# Patient Record
Sex: FEMALE | Race: WHITE | Employment: PART TIME | URBAN - METROPOLITAN AREA
[De-identification: names, ages, dates, MRNs, and addresses within clinical notes are randomized per-mention and may not be internally consistent; named-entity substitution may affect disease eponyms.]

---

## 2017-01-01 ENCOUNTER — ANTI-COAG VISIT (OUTPATIENT)
Dept: FAMILY MEDICINE CLINIC | Age: 82
End: 2017-01-01

## 2017-01-01 ENCOUNTER — ANTI-COAG VISIT (OUTPATIENT)
Dept: PHARMACY | Age: 82
End: 2017-01-01

## 2017-01-01 ENCOUNTER — TELEPHONE (OUTPATIENT)
Dept: CARDIOLOGY CLINIC | Age: 82
End: 2017-01-01

## 2017-01-01 ENCOUNTER — TELEPHONE (OUTPATIENT)
Dept: PHARMACY | Age: 82
End: 2017-01-01

## 2017-01-01 ENCOUNTER — HOSPITAL ENCOUNTER (OUTPATIENT)
Dept: OTHER | Age: 82
Discharge: HOME | End: 2017-11-27
Attending: FAMILY MEDICINE | Admitting: FAMILY MEDICINE

## 2017-01-01 ENCOUNTER — OFFICE VISIT (OUTPATIENT)
Dept: FAMILY MEDICINE CLINIC | Age: 82
End: 2017-01-01

## 2017-01-01 ENCOUNTER — OFFICE VISIT (OUTPATIENT)
Dept: CARDIOLOGY CLINIC | Age: 82
End: 2017-01-01

## 2017-01-01 ENCOUNTER — HOSPITAL ENCOUNTER (OUTPATIENT)
Dept: CT IMAGING | Age: 82
Discharge: HOME | End: 2017-02-03
Attending: INTERNAL MEDICINE | Admitting: INTERNAL MEDICINE

## 2017-01-01 ENCOUNTER — HOSPITAL ENCOUNTER (OUTPATIENT)
Dept: OTHER | Age: 82
Discharge: HOME | End: 2017-12-31
Attending: INTERNAL MEDICINE | Admitting: INTERNAL MEDICINE

## 2017-01-01 ENCOUNTER — HOSPITAL ENCOUNTER (OUTPATIENT)
Dept: OTHER | Age: 82
Discharge: HOME | End: 2017-10-31
Attending: FAMILY MEDICINE | Admitting: FAMILY MEDICINE

## 2017-01-01 ENCOUNTER — HOSPITAL ENCOUNTER (OUTPATIENT)
Dept: MAMMOGRAPHY | Age: 82
Discharge: HOME | End: 2017-11-20
Attending: FAMILY MEDICINE | Admitting: FAMILY MEDICINE

## 2017-01-01 ENCOUNTER — OFFICE VISIT (OUTPATIENT)
Dept: INFECTIOUS DISEASES | Age: 82
End: 2017-01-01

## 2017-01-01 ENCOUNTER — TELEPHONE (OUTPATIENT)
Dept: FAMILY MEDICINE CLINIC | Age: 82
End: 2017-01-01

## 2017-01-01 ENCOUNTER — HOSPITAL ENCOUNTER (OUTPATIENT)
Dept: OTHER | Age: 82
Discharge: HOME | End: 2017-01-25
Attending: INTERNAL MEDICINE | Admitting: INTERNAL MEDICINE

## 2017-01-01 ENCOUNTER — HOSPITAL ENCOUNTER (OUTPATIENT)
Dept: OTHER | Age: 82
Discharge: HOME | End: 2017-10-02
Attending: FAMILY MEDICINE | Admitting: FAMILY MEDICINE

## 2017-01-01 ENCOUNTER — HOSPITAL ENCOUNTER (OUTPATIENT)
Dept: OTHER | Age: 82
Discharge: HOME | End: 2017-01-26
Attending: FAMILY MEDICINE | Admitting: FAMILY MEDICINE

## 2017-01-01 ENCOUNTER — HOSPITAL ENCOUNTER (OUTPATIENT)
Dept: VASCULAR LAB | Age: 82
Discharge: HOME | End: 2017-06-23
Admitting: SURGERY

## 2017-01-01 ENCOUNTER — HOSPITAL ENCOUNTER (OUTPATIENT)
Dept: NON INVASIVE DIAGNOSTICS | Age: 82
Discharge: HOME | End: 2017-10-02
Attending: INTERNAL MEDICINE | Admitting: INTERNAL MEDICINE

## 2017-01-01 ENCOUNTER — TELEPHONE (OUTPATIENT)
Dept: VASCULAR SURGERY | Age: 82
End: 2017-01-01

## 2017-01-01 ENCOUNTER — HOSPITAL ENCOUNTER (OUTPATIENT)
Dept: OTHER | Age: 82
Discharge: HOME | End: 2017-08-22
Attending: FAMILY MEDICINE | Admitting: FAMILY MEDICINE

## 2017-01-01 ENCOUNTER — HOSPITAL ENCOUNTER (OUTPATIENT)
Dept: ENDOSCOPY | Age: 82
Discharge: HOME | End: 2017-12-06
Attending: INTERNAL MEDICINE | Admitting: INTERNAL MEDICINE

## 2017-01-01 ENCOUNTER — OFFICE VISIT (OUTPATIENT)
Dept: VASCULAR SURGERY | Age: 82
End: 2017-01-01

## 2017-01-01 ENCOUNTER — OFFICE VISIT (OUTPATIENT)
Dept: ORTHOPEDIC SURGERY | Age: 82
End: 2017-01-01

## 2017-01-01 ENCOUNTER — HOSPITAL ENCOUNTER (OUTPATIENT)
Dept: OTHER | Age: 82
Discharge: HOME | End: 2017-01-11
Attending: INTERNAL MEDICINE | Admitting: INTERNAL MEDICINE

## 2017-01-01 ENCOUNTER — HOSPITAL ENCOUNTER (OUTPATIENT)
Dept: GENERAL RADIOLOGY | Age: 82
Discharge: HOME | End: 2017-09-19
Attending: FAMILY MEDICINE | Admitting: FAMILY MEDICINE

## 2017-01-01 ENCOUNTER — HOSPITAL ENCOUNTER (OUTPATIENT)
Dept: OTHER | Age: 82
Discharge: HOME | End: 2017-06-30
Attending: INTERNAL MEDICINE | Admitting: INTERNAL MEDICINE

## 2017-01-01 ENCOUNTER — HOSPITAL ENCOUNTER (OUTPATIENT)
Dept: VASCULAR LAB | Age: 82
Discharge: HOME | End: 2017-12-29
Attending: SURGERY | Admitting: SURGERY

## 2017-01-01 ENCOUNTER — HOSPITAL ENCOUNTER (OUTPATIENT)
Dept: OTHER | Age: 82
Discharge: HOME | End: 2017-03-06
Attending: FAMILY MEDICINE | Admitting: FAMILY MEDICINE

## 2017-01-01 ENCOUNTER — HOSPITAL ENCOUNTER (OUTPATIENT)
Dept: OTHER | Age: 82
Discharge: HOME | End: 2017-11-30
Attending: INTERNAL MEDICINE | Admitting: INTERNAL MEDICINE

## 2017-01-01 ENCOUNTER — HOSPITAL ENCOUNTER (OUTPATIENT)
Dept: OTHER | Age: 82
Discharge: HOME | End: 2017-11-30
Attending: FAMILY MEDICINE | Admitting: FAMILY MEDICINE

## 2017-01-01 ENCOUNTER — HOSPITAL ENCOUNTER (OUTPATIENT)
Dept: OTHER | Age: 82
Discharge: HOME | End: 2017-07-18
Attending: FAMILY MEDICINE | Admitting: FAMILY MEDICINE

## 2017-01-01 VITALS
BODY MASS INDEX: 22.71 KG/M2 | SYSTOLIC BLOOD PRESSURE: 140 MMHG | HEART RATE: 68 BPM | HEIGHT: 64 IN | WEIGHT: 133 LBS | DIASTOLIC BLOOD PRESSURE: 60 MMHG

## 2017-01-01 VITALS
SYSTOLIC BLOOD PRESSURE: 120 MMHG | DIASTOLIC BLOOD PRESSURE: 62 MMHG | HEIGHT: 64 IN | OXYGEN SATURATION: 98 % | WEIGHT: 140 LBS | BODY MASS INDEX: 23.9 KG/M2 | HEART RATE: 85 BPM

## 2017-01-01 VITALS
DIASTOLIC BLOOD PRESSURE: 70 MMHG | WEIGHT: 138 LBS | SYSTOLIC BLOOD PRESSURE: 130 MMHG | BODY MASS INDEX: 23.69 KG/M2 | HEART RATE: 104 BPM | OXYGEN SATURATION: 98 %

## 2017-01-01 VITALS
BODY MASS INDEX: 23.34 KG/M2 | WEIGHT: 136 LBS | HEART RATE: 88 BPM | OXYGEN SATURATION: 98 % | DIASTOLIC BLOOD PRESSURE: 78 MMHG | SYSTOLIC BLOOD PRESSURE: 138 MMHG

## 2017-01-01 VITALS
SYSTOLIC BLOOD PRESSURE: 142 MMHG | BODY MASS INDEX: 25.58 KG/M2 | DIASTOLIC BLOOD PRESSURE: 70 MMHG | OXYGEN SATURATION: 93 % | HEART RATE: 85 BPM | WEIGHT: 149 LBS

## 2017-01-01 VITALS
HEIGHT: 64 IN | BODY MASS INDEX: 26.46 KG/M2 | DIASTOLIC BLOOD PRESSURE: 66 MMHG | SYSTOLIC BLOOD PRESSURE: 144 MMHG | WEIGHT: 155 LBS

## 2017-01-01 VITALS
WEIGHT: 132 LBS | OXYGEN SATURATION: 99 % | SYSTOLIC BLOOD PRESSURE: 116 MMHG | HEIGHT: 64 IN | BODY MASS INDEX: 22.53 KG/M2 | DIASTOLIC BLOOD PRESSURE: 70 MMHG | HEART RATE: 92 BPM

## 2017-01-01 VITALS
WEIGHT: 133.9 LBS | HEART RATE: 78 BPM | HEIGHT: 64 IN | SYSTOLIC BLOOD PRESSURE: 138 MMHG | BODY MASS INDEX: 22.86 KG/M2 | DIASTOLIC BLOOD PRESSURE: 70 MMHG

## 2017-01-01 VITALS
HEIGHT: 64 IN | RESPIRATION RATE: 16 BRPM | TEMPERATURE: 97.4 F | SYSTOLIC BLOOD PRESSURE: 131 MMHG | HEART RATE: 70 BPM | BODY MASS INDEX: 22.53 KG/M2 | DIASTOLIC BLOOD PRESSURE: 80 MMHG | WEIGHT: 132 LBS

## 2017-01-01 VITALS
HEART RATE: 87 BPM | DIASTOLIC BLOOD PRESSURE: 50 MMHG | OXYGEN SATURATION: 96 % | WEIGHT: 136 LBS | SYSTOLIC BLOOD PRESSURE: 140 MMHG | BODY MASS INDEX: 22.63 KG/M2

## 2017-01-01 VITALS
WEIGHT: 133 LBS | TEMPERATURE: 98 F | HEIGHT: 64 IN | SYSTOLIC BLOOD PRESSURE: 137 MMHG | DIASTOLIC BLOOD PRESSURE: 81 MMHG | HEART RATE: 84 BPM | BODY MASS INDEX: 22.71 KG/M2

## 2017-01-01 VITALS — DIASTOLIC BLOOD PRESSURE: 64 MMHG | BODY MASS INDEX: 22.83 KG/M2 | WEIGHT: 133 LBS | SYSTOLIC BLOOD PRESSURE: 136 MMHG

## 2017-01-01 VITALS
BODY MASS INDEX: 25.06 KG/M2 | OXYGEN SATURATION: 97 % | DIASTOLIC BLOOD PRESSURE: 70 MMHG | WEIGHT: 146 LBS | HEART RATE: 99 BPM | SYSTOLIC BLOOD PRESSURE: 140 MMHG

## 2017-01-01 VITALS
SYSTOLIC BLOOD PRESSURE: 136 MMHG | OXYGEN SATURATION: 99 % | WEIGHT: 136 LBS | DIASTOLIC BLOOD PRESSURE: 62 MMHG | HEART RATE: 76 BPM | BODY MASS INDEX: 23.34 KG/M2

## 2017-01-01 VITALS
HEART RATE: 76 BPM | WEIGHT: 147 LBS | BODY MASS INDEX: 25.1 KG/M2 | SYSTOLIC BLOOD PRESSURE: 134 MMHG | DIASTOLIC BLOOD PRESSURE: 70 MMHG | HEIGHT: 64 IN

## 2017-01-01 DIAGNOSIS — E78.5 HYPERLIPIDEMIA, UNSPECIFIED HYPERLIPIDEMIA TYPE: Chronic | ICD-10-CM

## 2017-01-01 DIAGNOSIS — I10 ESSENTIAL HYPERTENSION: ICD-10-CM

## 2017-01-01 DIAGNOSIS — F51.01 PRIMARY INSOMNIA: ICD-10-CM

## 2017-01-01 DIAGNOSIS — R53.83 FATIGUE, UNSPECIFIED TYPE: ICD-10-CM

## 2017-01-01 DIAGNOSIS — I48.91 ATRIAL FIBRILLATION, UNSPECIFIED TYPE (HCC): ICD-10-CM

## 2017-01-01 DIAGNOSIS — I10 ESSENTIAL HYPERTENSION: Chronic | ICD-10-CM

## 2017-01-01 DIAGNOSIS — K21.9 GASTROESOPHAGEAL REFLUX DISEASE WITHOUT ESOPHAGITIS: ICD-10-CM

## 2017-01-01 DIAGNOSIS — I73.9 PAD (PERIPHERAL ARTERY DISEASE) (HCC): ICD-10-CM

## 2017-01-01 DIAGNOSIS — M35.3 PMR (POLYMYALGIA RHEUMATICA) (HCC): Primary | ICD-10-CM

## 2017-01-01 DIAGNOSIS — D50.9 IRON DEFICIENCY ANEMIA, UNSPECIFIED IRON DEFICIENCY ANEMIA TYPE: ICD-10-CM

## 2017-01-01 DIAGNOSIS — I48.20 CHRONIC ATRIAL FIBRILLATION (HCC): ICD-10-CM

## 2017-01-01 DIAGNOSIS — K83.1 AMPULLARY STENOSIS: ICD-10-CM

## 2017-01-01 DIAGNOSIS — H61.22 IMPACTED CERUMEN OF LEFT EAR: ICD-10-CM

## 2017-01-01 DIAGNOSIS — I65.23 BILATERAL CAROTID ARTERY STENOSIS: Chronic | ICD-10-CM

## 2017-01-01 DIAGNOSIS — I25.10 ASHD (ARTERIOSCLEROTIC HEART DISEASE): Primary | Chronic | ICD-10-CM

## 2017-01-01 DIAGNOSIS — I48.20 CHRONIC ATRIAL FIBRILLATION (HCC): Chronic | ICD-10-CM

## 2017-01-01 DIAGNOSIS — E08.51 DIABETES MELLITUS DUE TO UNDERLYING CONDITION WITH DIABETIC PERIPHERAL ANGIOPATHY WITHOUT GANGRENE, WITHOUT LONG-TERM CURRENT USE OF INSULIN (HCC): Chronic | ICD-10-CM

## 2017-01-01 DIAGNOSIS — I48.91 ATRIAL FIBRILLATION, UNSPECIFIED TYPE (HCC): Chronic | ICD-10-CM

## 2017-01-01 DIAGNOSIS — M00.9 PYOGENIC ARTHRITIS OF RIGHT KNEE JOINT, DUE TO UNSPECIFIED ORGANISM (HCC): Primary | ICD-10-CM

## 2017-01-01 DIAGNOSIS — I10 ESSENTIAL HYPERTENSION: Primary | Chronic | ICD-10-CM

## 2017-01-01 DIAGNOSIS — I34.0 NON-RHEUMATIC MITRAL REGURGITATION: Primary | ICD-10-CM

## 2017-01-01 DIAGNOSIS — D64.9 ANEMIA, UNSPECIFIED TYPE: ICD-10-CM

## 2017-01-01 DIAGNOSIS — I48.21 PERMANENT ATRIAL FIBRILLATION (HCC): ICD-10-CM

## 2017-01-01 DIAGNOSIS — E11.51 TYPE 2 DIABETES MELLITUS WITH DIABETIC PERIPHERAL ANGIOPATHY WITHOUT GANGRENE, WITHOUT LONG-TERM CURRENT USE OF INSULIN (HCC): ICD-10-CM

## 2017-01-01 DIAGNOSIS — I25.10 ASHD (ARTERIOSCLEROTIC HEART DISEASE): Chronic | ICD-10-CM

## 2017-01-01 DIAGNOSIS — R53.1 GENERALIZED WEAKNESS: ICD-10-CM

## 2017-01-01 DIAGNOSIS — Z78.0 POST-MENOPAUSAL: ICD-10-CM

## 2017-01-01 DIAGNOSIS — I65.23 BILATERAL CAROTID ARTERY STENOSIS: ICD-10-CM

## 2017-01-01 DIAGNOSIS — E08.51 DIABETES MELLITUS DUE TO UNDERLYING CONDITION WITH DIABETIC PERIPHERAL ANGIOPATHY WITHOUT GANGRENE, WITHOUT LONG-TERM CURRENT USE OF INSULIN (HCC): ICD-10-CM

## 2017-01-01 DIAGNOSIS — I65.23 CAROTID ATHEROSCLEROSIS, BILATERAL: Primary | ICD-10-CM

## 2017-01-01 DIAGNOSIS — I25.10 ATHEROSCLEROTIC HEART DISEASE OF NATIVE CORONARY ARTERY WITHOUT ANGINA PECTORIS: ICD-10-CM

## 2017-01-01 DIAGNOSIS — M35.3 PMR (POLYMYALGIA RHEUMATICA) (HCC): ICD-10-CM

## 2017-01-01 DIAGNOSIS — M85.80 OSTEOPENIA, UNSPECIFIED LOCATION: ICD-10-CM

## 2017-01-01 DIAGNOSIS — R10.11 ABDOMINAL PAIN, RUQ: ICD-10-CM

## 2017-01-01 DIAGNOSIS — I73.9 PERIPHERAL VASCULAR DISEASE, UNSPECIFIED (HCC): Primary | ICD-10-CM

## 2017-01-01 DIAGNOSIS — I65.23 CAROTID ATHEROSCLEROSIS, BILATERAL: ICD-10-CM

## 2017-01-01 DIAGNOSIS — K83.1 OBSTRUCTION OF BILE DUCT: ICD-10-CM

## 2017-01-01 DIAGNOSIS — I73.9 PAD (PERIPHERAL ARTERY DISEASE) (HCC): Chronic | ICD-10-CM

## 2017-01-01 DIAGNOSIS — F41.9 ANXIETY: Primary | ICD-10-CM

## 2017-01-01 DIAGNOSIS — Z78.0 ASYMPTOMATIC MENOPAUSAL STATE: ICD-10-CM

## 2017-01-01 DIAGNOSIS — D50.0 IRON DEFICIENCY ANEMIA DUE TO CHRONIC BLOOD LOSS: ICD-10-CM

## 2017-01-01 DIAGNOSIS — E08.51 DIABETES MELLITUS DUE TO UNDERLYING CONDITION WITH DIABETIC PERIPHERAL ANGIOPATHY WITHOUT GANGRENE, WITHOUT LONG-TERM CURRENT USE OF INSULIN (HCC): Primary | ICD-10-CM

## 2017-01-01 DIAGNOSIS — E78.49 OTHER HYPERLIPIDEMIA: Chronic | ICD-10-CM

## 2017-01-01 DIAGNOSIS — M85.80 OSTEOPENIA, UNSPECIFIED LOCATION: Primary | ICD-10-CM

## 2017-01-01 DIAGNOSIS — I73.9 PERIPHERAL VASCULAR DISEASE (HCC): ICD-10-CM

## 2017-01-01 DIAGNOSIS — I48.21 PERMANENT ATRIAL FIBRILLATION (HCC): Chronic | ICD-10-CM

## 2017-01-01 DIAGNOSIS — I73.9 PERIPHERAL VASCULAR DISEASE, UNSPECIFIED (HCC): ICD-10-CM

## 2017-01-01 DIAGNOSIS — I70.212 ATHEROSCLEROSIS OF NATIVE ARTERIES OF EXTREMITIES WITH INTERMITTENT CLAUDICATION, LEFT LEG (HCC): ICD-10-CM

## 2017-01-01 DIAGNOSIS — I25.10 ASHD (ARTERIOSCLEROTIC HEART DISEASE): ICD-10-CM

## 2017-01-01 DIAGNOSIS — Z78.0 POST-MENOPAUSAL: Primary | ICD-10-CM

## 2017-01-01 DIAGNOSIS — M85.852 OSTEOPENIA OF LEFT THIGH: ICD-10-CM

## 2017-01-01 DIAGNOSIS — I34.1 MITRAL VALVE PROLAPSE: ICD-10-CM

## 2017-01-01 DIAGNOSIS — Z12.31 ENCOUNTER FOR SCREENING MAMMOGRAM FOR BREAST CANCER: ICD-10-CM

## 2017-01-01 DIAGNOSIS — I34.0 NON-RHEUMATIC MITRAL REGURGITATION: ICD-10-CM

## 2017-01-01 DIAGNOSIS — I65.23 OCCLUSION AND STENOSIS OF BILATERAL CAROTID ARTERIES: ICD-10-CM

## 2017-01-01 LAB
A/G RATIO: 1.4 (ref 1.1–2.2)
A/G RATIO: 1.5 (ref 1.1–2.2)
A/G RATIO: 1.6 (CALC) (ref 1–2.5)
ALBUMIN SERPL-MCNC: 4 G/DL (ref 3.6–5.1)
ALBUMIN SERPL-MCNC: 4.3 G/DL (ref 3.4–5)
ALBUMIN SERPL-MCNC: 4.5 G/DL (ref 3.4–5)
ALP BLD-CCNC: 75 U/L (ref 33–130)
ALP BLD-CCNC: 77 U/L (ref 40–129)
ALP BLD-CCNC: 97 U/L (ref 40–129)
ALT SERPL-CCNC: 11 U/L (ref 6–29)
ALT SERPL-CCNC: 16 U/L (ref 10–40)
ALT SERPL-CCNC: 17 U/L (ref 10–40)
ANION GAP SERPL CALCULATED.3IONS-SCNC: 16 MMOL/L (ref 3–16)
ANION GAP SERPL CALCULATED.3IONS-SCNC: 17 MMOL/L (ref 3–16)
AST SERPL-CCNC: 19 U/L (ref 10–35)
AST SERPL-CCNC: 23 U/L (ref 15–37)
AST SERPL-CCNC: 26 U/L (ref 15–37)
BASOPHILS ABSOLUTE: 0 K/UL (ref 0–0.2)
BASOPHILS ABSOLUTE: 0.1 K/UL (ref 0–0.2)
BASOPHILS ABSOLUTE: 0.1 K/UL (ref 0–0.2)
BASOPHILS ABSOLUTE: 41 CELLS/UL (ref 0–200)
BASOPHILS RELATIVE PERCENT: 0.6 %
BASOPHILS RELATIVE PERCENT: 0.6 %
BASOPHILS RELATIVE PERCENT: 0.7 %
BASOPHILS RELATIVE PERCENT: 0.9 %
BILIRUB SERPL-MCNC: 0.3 MG/DL (ref 0–1)
BILIRUB SERPL-MCNC: 0.5 MG/DL (ref 0–1)
BILIRUB SERPL-MCNC: 0.6 MG/DL (ref 0.2–1.2)
BUN / CREAT RATIO: 14 (CALC) (ref 6–22)
BUN BLDV-MCNC: 16 MG/DL (ref 7–25)
BUN BLDV-MCNC: 17 MG/DL (ref 7–20)
BUN BLDV-MCNC: 20 MG/DL (ref 7–20)
C-REACTIVE PROTEIN WIDE RANGE: 2.69 MG/DL
CALCIUM SERPL-MCNC: 10 MG/DL (ref 8.3–10.6)
CALCIUM SERPL-MCNC: 9.3 MG/DL (ref 8.6–10.4)
CALCIUM SERPL-MCNC: 9.5 MG/DL (ref 8.3–10.6)
CHLORIDE BLD-SCNC: 105 MMOL/L (ref 98–110)
CHLORIDE BLD-SCNC: 105 MMOL/L (ref 99–110)
CHLORIDE BLD-SCNC: 99 MMOL/L (ref 99–110)
CHOLESTEROL, TOTAL: 137 MG/DL (ref 0–199)
CO2: 24 MMOL/L (ref 21–32)
CO2: 26 MMOL/L (ref 21–32)
CO2: 27 MMOL/L (ref 20–31)
CREAT SERPL-MCNC: 0.9 MG/DL (ref 0.6–1.2)
CREAT SERPL-MCNC: 1 MG/DL (ref 0.6–1.2)
CREAT SERPL-MCNC: 1.15 MG/DL (ref 0.6–0.93)
CREATININE URINE: 90.3 MG/DL (ref 28–259)
EOSINOPHILS ABSOLUTE: 0.3 K/UL (ref 0–0.6)
EOSINOPHILS ABSOLUTE: 0.3 K/UL (ref 0–0.6)
EOSINOPHILS ABSOLUTE: 0.6 K/UL (ref 0–0.6)
EOSINOPHILS ABSOLUTE: 317 CELLS/UL (ref 15–500)
EOSINOPHILS RELATIVE PERCENT: 3.8 %
EOSINOPHILS RELATIVE PERCENT: 4.6 %
EOSINOPHILS RELATIVE PERCENT: 4.6 %
EOSINOPHILS RELATIVE PERCENT: 5.8 %
ESTIMATED AVERAGE GLUCOSE: 131.2 MG/DL
ESTIMATED AVERAGE GLUCOSE: 137 MG/DL
ESTIMATED AVERAGE GLUCOSE: 151.3 MG/DL
FERRITIN: 172.5 NG/ML (ref 15–150)
FERRITIN: 70.4 NG/ML (ref 15–150)
FERRITIN: 90.2 NG/ML (ref 15–150)
FOLATE: >20 NG/ML (ref 4.78–24.2)
GFR AFRICAN AMERICAN: 52 ML/MIN/1.73M2
GFR AFRICAN AMERICAN: >60
GFR AFRICAN AMERICAN: >60
GFR NON-AFRICAN AMERICAN: 53
GFR NON-AFRICAN AMERICAN: >60
GFR SERPL CREATININE-BSD FRML MDRD: 45 ML/MIN/1.73M2
GLOBULIN: 2.5 G/DL (CALC) (ref 1.9–3.7)
GLOBULIN: 2.9 G/DL
GLOBULIN: 3.3 G/DL
GLUCOSE BLD-MCNC: 111 MG/DL (ref 70–99)
GLUCOSE BLD-MCNC: 91 MG/DL (ref 65–99)
GLUCOSE BLD-MCNC: 96 MG/DL (ref 70–99)
HBA1C MFR BLD: 6.2 %
HBA1C MFR BLD: 6.4 %
HBA1C MFR BLD: 6.9 %
HCT VFR BLD CALC: 28.2 % (ref 35–45)
HCT VFR BLD CALC: 31 % (ref 36–48)
HCT VFR BLD CALC: 31.2 % (ref 36–48)
HCT VFR BLD CALC: 32.1 % (ref 36–48)
HCT VFR BLD CALC: 32.8 % (ref 36–48)
HCT VFR BLD CALC: 33 % (ref 36–48)
HDLC SERPL-MCNC: 50 MG/DL (ref 40–60)
HEMOGLOBIN: 10.1 G/DL (ref 12–16)
HEMOGLOBIN: 10.3 G/DL (ref 12–16)
HEMOGLOBIN: 10.4 G/DL (ref 12–16)
HEMOGLOBIN: 10.8 G/DL (ref 12–16)
HEMOGLOBIN: 9.1 G/DL (ref 11.7–15.5)
HEMOGLOBIN: 9.6 G/DL (ref 12–16)
IMMATURE RETIC FRACT: 0.36 (ref 0.21–0.37)
INR BLD: 1.6
INR BLD: 1.7
INR BLD: 1.8
INR BLD: 2
INR BLD: 2
INR BLD: 2.1
INR BLD: 2.2
INR BLD: 2.3
INR BLD: 2.4
INR BLD: 2.5
INR BLD: 2.7
INR BLD: 2.8
INR BLD: 2.8
INR BLD: 2.9
INR BLD: 2.9
INR BLD: 3.2
INR BLD: 3.3
INR BLD: 3.9
IRON SATURATION: 12 % (ref 15–50)
IRON: 35 UG/DL (ref 37–145)
LDL CHOLESTEROL CALCULATED: 69 MG/DL
LIPASE: 25 U/L (ref 13–60)
LV EF: 53 %
LVEF MODALITY: NORMAL
LYMPHOCYTES ABSOLUTE: 1.5 K/UL (ref 1–5.1)
LYMPHOCYTES ABSOLUTE: 1.6 K/UL (ref 1–5.1)
LYMPHOCYTES ABSOLUTE: 1.9 K/UL (ref 1–5.1)
LYMPHOCYTES ABSOLUTE: 1435 CELLS/UL (ref 850–3900)
LYMPHOCYTES RELATIVE PERCENT: 17.1 %
LYMPHOCYTES RELATIVE PERCENT: 17.2 %
LYMPHOCYTES RELATIVE PERCENT: 20.8 %
LYMPHOCYTES RELATIVE PERCENT: 22 %
MAGNESIUM: 1.8 MG/DL (ref 1.8–2.4)
MCH RBC QN AUTO: 24.5 PG (ref 26–34)
MCH RBC QN AUTO: 25.1 PG (ref 26–34)
MCH RBC QN AUTO: 25.5 PG (ref 27–33)
MCH RBC QN AUTO: 25.8 PG (ref 26–34)
MCH RBC QN AUTO: 26.8 PG (ref 26–34)
MCH RBC QN AUTO: 26.8 PG (ref 26–34)
MCHC RBC AUTO-ENTMCNC: 30.9 G/DL (ref 31–36)
MCHC RBC AUTO-ENTMCNC: 31.5 G/DL (ref 31–36)
MCHC RBC AUTO-ENTMCNC: 32.2 G/DL (ref 32–36)
MCHC RBC AUTO-ENTMCNC: 32.4 G/DL (ref 31–36)
MCHC RBC AUTO-ENTMCNC: 32.5 G/DL (ref 31–36)
MCHC RBC AUTO-ENTMCNC: 32.9 G/DL (ref 31–36)
MCV RBC AUTO: 79.2 FL (ref 80–100)
MCV RBC AUTO: 79.4 FL (ref 80–100)
MCV RBC AUTO: 79.6 FL (ref 80–100)
MCV RBC AUTO: 79.6 FL (ref 80–100)
MCV RBC AUTO: 81.7 FL (ref 80–100)
MCV RBC AUTO: 82.3 FL (ref 80–100)
MICROALBUMIN UR-MCNC: <1.2 MG/DL
MICROALBUMIN/CREAT UR-RTO: NORMAL MG/G (ref 0–30)
MONOCYTES ABSOLUTE: 0.9 K/UL (ref 0–1.3)
MONOCYTES ABSOLUTE: 1 K/UL (ref 0–1.3)
MONOCYTES ABSOLUTE: 1.4 K/UL (ref 0–1.3)
MONOCYTES ABSOLUTE: 683 CELLS/UL (ref 200–950)
MONOCYTES RELATIVE PERCENT: 11.3 %
MONOCYTES RELATIVE PERCENT: 12.6 %
MONOCYTES RELATIVE PERCENT: 12.9 %
MONOCYTES RELATIVE PERCENT: 9.9 %
NEUTROPHILS ABSOLUTE: 4.5 K/UL (ref 1.7–7.7)
NEUTROPHILS ABSOLUTE: 4423 CELLS/UL (ref 1500–7800)
NEUTROPHILS ABSOLUTE: 5.8 K/UL (ref 1.7–7.7)
NEUTROPHILS ABSOLUTE: 7.1 K/UL (ref 1.7–7.7)
NEUTROPHILS RELATIVE PERCENT: 60.2 %
NEUTROPHILS RELATIVE PERCENT: 63.5 %
NEUTROPHILS RELATIVE PERCENT: 66.8 %
PDW BLD-RTO: 16 % (ref 11–15)
PDW BLD-RTO: 16 % (ref 12.4–15.4)
PDW BLD-RTO: 16.3 % (ref 12.4–15.4)
PDW BLD-RTO: 16.5 % (ref 12.4–15.4)
PDW BLD-RTO: 16.7 % (ref 12.4–15.4)
PDW BLD-RTO: 17.2 % (ref 12.4–15.4)
PLATELET # BLD: 138 K/UL (ref 135–450)
PLATELET # BLD: 150 K/UL (ref 135–450)
PLATELET # BLD: 151 K/UL (ref 135–450)
PLATELET # BLD: 178 THOUSAND/UL (ref 140–400)
PLATELET # BLD: 208 K/UL (ref 135–450)
PLATELET # BLD: 223 K/UL (ref 135–450)
PMV BLD AUTO: 10 FL (ref 5–10.5)
PMV BLD AUTO: 10.4 FL (ref 5–10.5)
PMV BLD AUTO: 10.4 FL (ref 7.5–11.5)
PMV BLD AUTO: 10.7 FL (ref 5–10.5)
PMV BLD AUTO: 9.6 FL (ref 5–10.5)
PMV BLD AUTO: 9.7 FL (ref 5–10.5)
POTASSIUM SERPL-SCNC: 3.3 MMOL/L (ref 3.5–5.3)
POTASSIUM SERPL-SCNC: 4.4 MMOL/L (ref 3.5–5.1)
POTASSIUM SERPL-SCNC: 4.6 MMOL/L (ref 3.5–5.1)
PROTIME: 19.8 SECONDS
PROTIME: 20 SECONDS
PROTIME: 22 SECONDS
PROTIME: 23.6 SECONDS
PROTIME: 24.3 SECONDS
PROTIME: 27.5 SECONDS
PROTIME: 32.9 SECONDS
PROTIME: 33.8 SECONDS
PROTIME: 34.1 SECONDS
PROTIME: 34.8 SECONDS
PROTIME: 38.2 SECONDS
PROTIME: 40.1 SECONDS
RBC # BLD: 3.56 MILLION/UL (ref 3.8–5.1)
RBC # BLD: 3.9 M/UL (ref 4–5.2)
RBC # BLD: 3.9 M/UL (ref 4–5.2)
RBC # BLD: 3.92 M/UL (ref 4–5.2)
RBC # BLD: 4.04 M/UL (ref 4–5.2)
RBC # BLD: 4.12 M/UL (ref 4–5.2)
RETICULOCYTE ABSOLUTE COUNT: 0.05 M/UL (ref 0.02–0.1)
RETICULOCYTE COUNT PCT: 1.13 % (ref 0.5–2.18)
SEDIMENTATION RATE, ERYTHROCYTE: 12 MM/HR (ref 0–30)
SEDIMENTATION RATE, ERYTHROCYTE: 16 MM/H
SEDIMENTATION RATE, ERYTHROCYTE: 50 MM/HR (ref 0–30)
SEDIMENTATION RATE, ERYTHROCYTE: 7 MM/HR (ref 0–30)
SEDIMENTATION RATE, ERYTHROCYTE: 71 MM/HR (ref 0–30)
SEDIMENTATION RATE, ERYTHROCYTE: 9 MM/HR (ref 0–30)
SEGMENTED NEUTROPHILS RELATIVE PERCENT: 64.1 %
SODIUM BLD-SCNC: 141 MMOL/L (ref 135–146)
SODIUM BLD-SCNC: 142 MMOL/L (ref 136–145)
SODIUM BLD-SCNC: 145 MMOL/L (ref 136–145)
SPECIMEN INTEGRITY COMPROMISED: NORMAL
TOTAL IRON BINDING CAPACITY: 303 UG/DL (ref 260–445)
TOTAL PROTEIN: 6.5 G/DL (ref 6.1–8.1)
TOTAL PROTEIN: 7.2 G/DL (ref 6.4–8.2)
TOTAL PROTEIN: 7.8 G/DL (ref 6.4–8.2)
TRIGL SERPL-MCNC: 89 MG/DL (ref 0–150)
TSH REFLEX: 3.6 UIU/ML (ref 0.27–4.2)
VANCOMYCIN TROUGH: 12.6 MG/L (ref 10–20)
VITAMIN B-12: 408 PG/ML (ref 211–911)
VITAMIN D 25-HYDROXY: 34.5 NG/ML
VLDLC SERPL CALC-MCNC: 18 MG/DL
WBC # BLD: 10.5 K/UL (ref 4–11)
WBC # BLD: 11.2 K/UL (ref 4–11)
WBC # BLD: 6.9 THOUSAND/UL (ref 3.8–10.8)
WBC # BLD: 7.5 K/UL (ref 4–11)
WBC # BLD: 8.1 K/UL (ref 4–11)
WBC # BLD: 8.6 K/UL (ref 4–11)

## 2017-01-01 PROCEDURE — 1036F TOBACCO NON-USER: CPT | Performed by: FAMILY MEDICINE

## 2017-01-01 PROCEDURE — 99214 OFFICE O/P EST MOD 30 MIN: CPT | Performed by: INTERNAL MEDICINE

## 2017-01-01 PROCEDURE — 99213 OFFICE O/P EST LOW 20 MIN: CPT | Performed by: INTERNAL MEDICINE

## 2017-01-01 PROCEDURE — G8399 PT W/DXA RESULTS DOCUMENT: HCPCS | Performed by: FAMILY MEDICINE

## 2017-01-01 PROCEDURE — G8399 PT W/DXA RESULTS DOCUMENT: HCPCS | Performed by: INTERNAL MEDICINE

## 2017-01-01 PROCEDURE — G8484 FLU IMMUNIZE NO ADMIN: HCPCS | Performed by: INTERNAL MEDICINE

## 2017-01-01 PROCEDURE — 4040F PNEUMOC VAC/ADMIN/RCVD: CPT | Performed by: INTERNAL MEDICINE

## 2017-01-01 PROCEDURE — 1090F PRES/ABSN URINE INCON ASSESS: CPT | Performed by: FAMILY MEDICINE

## 2017-01-01 PROCEDURE — G8598 ASA/ANTIPLAT THER USED: HCPCS | Performed by: FAMILY MEDICINE

## 2017-01-01 PROCEDURE — G8420 CALC BMI NORM PARAMETERS: HCPCS | Performed by: INTERNAL MEDICINE

## 2017-01-01 PROCEDURE — 1090F PRES/ABSN URINE INCON ASSESS: CPT | Performed by: INTERNAL MEDICINE

## 2017-01-01 PROCEDURE — G8427 DOCREV CUR MEDS BY ELIG CLIN: HCPCS | Performed by: NURSE PRACTITIONER

## 2017-01-01 PROCEDURE — 1123F ACP DISCUSS/DSCN MKR DOCD: CPT | Performed by: SURGERY

## 2017-01-01 PROCEDURE — 99214 OFFICE O/P EST MOD 30 MIN: CPT | Performed by: FAMILY MEDICINE

## 2017-01-01 PROCEDURE — G8427 DOCREV CUR MEDS BY ELIG CLIN: HCPCS | Performed by: INTERNAL MEDICINE

## 2017-01-01 PROCEDURE — G8419 CALC BMI OUT NRM PARAM NOF/U: HCPCS | Performed by: INTERNAL MEDICINE

## 2017-01-01 PROCEDURE — G8399 PT W/DXA RESULTS DOCUMENT: HCPCS | Performed by: NURSE PRACTITIONER

## 2017-01-01 PROCEDURE — 1036F TOBACCO NON-USER: CPT | Performed by: NURSE PRACTITIONER

## 2017-01-01 PROCEDURE — G8598 ASA/ANTIPLAT THER USED: HCPCS | Performed by: INTERNAL MEDICINE

## 2017-01-01 PROCEDURE — 1123F ACP DISCUSS/DSCN MKR DOCD: CPT | Performed by: FAMILY MEDICINE

## 2017-01-01 PROCEDURE — 1123F ACP DISCUSS/DSCN MKR DOCD: CPT | Performed by: INTERNAL MEDICINE

## 2017-01-01 PROCEDURE — G8598 ASA/ANTIPLAT THER USED: HCPCS | Performed by: NURSE PRACTITIONER

## 2017-01-01 PROCEDURE — 1036F TOBACCO NON-USER: CPT | Performed by: SURGERY

## 2017-01-01 PROCEDURE — 1090F PRES/ABSN URINE INCON ASSESS: CPT | Performed by: SURGERY

## 2017-01-01 PROCEDURE — 99213 OFFICE O/P EST LOW 20 MIN: CPT | Performed by: SURGERY

## 2017-01-01 PROCEDURE — G8420 CALC BMI NORM PARAMETERS: HCPCS | Performed by: FAMILY MEDICINE

## 2017-01-01 PROCEDURE — G8427 DOCREV CUR MEDS BY ELIG CLIN: HCPCS | Performed by: FAMILY MEDICINE

## 2017-01-01 PROCEDURE — G8419 CALC BMI OUT NRM PARAM NOF/U: HCPCS | Performed by: FAMILY MEDICINE

## 2017-01-01 PROCEDURE — 4040F PNEUMOC VAC/ADMIN/RCVD: CPT | Performed by: FAMILY MEDICINE

## 2017-01-01 PROCEDURE — 4040F PNEUMOC VAC/ADMIN/RCVD: CPT | Performed by: NURSE PRACTITIONER

## 2017-01-01 PROCEDURE — 1036F TOBACCO NON-USER: CPT | Performed by: INTERNAL MEDICINE

## 2017-01-01 PROCEDURE — G8484 FLU IMMUNIZE NO ADMIN: HCPCS | Performed by: FAMILY MEDICINE

## 2017-01-01 PROCEDURE — G0008 ADMIN INFLUENZA VIRUS VAC: HCPCS | Performed by: FAMILY MEDICINE

## 2017-01-01 PROCEDURE — 1111F DSCHRG MED/CURRENT MED MERGE: CPT | Performed by: INTERNAL MEDICINE

## 2017-01-01 PROCEDURE — 1090F PRES/ABSN URINE INCON ASSESS: CPT | Performed by: NURSE PRACTITIONER

## 2017-01-01 PROCEDURE — 99214 OFFICE O/P EST MOD 30 MIN: CPT | Performed by: NURSE PRACTITIONER

## 2017-01-01 PROCEDURE — G8420 CALC BMI NORM PARAMETERS: HCPCS | Performed by: NURSE PRACTITIONER

## 2017-01-01 PROCEDURE — 99213 OFFICE O/P EST LOW 20 MIN: CPT | Performed by: FAMILY MEDICINE

## 2017-01-01 PROCEDURE — 69209 REMOVE IMPACTED EAR WAX UNI: CPT | Performed by: FAMILY MEDICINE

## 2017-01-01 PROCEDURE — 99024 POSTOP FOLLOW-UP VISIT: CPT | Performed by: NURSE PRACTITIONER

## 2017-01-01 PROCEDURE — G8484 FLU IMMUNIZE NO ADMIN: HCPCS | Performed by: SURGERY

## 2017-01-01 PROCEDURE — G8427 DOCREV CUR MEDS BY ELIG CLIN: HCPCS | Performed by: SURGERY

## 2017-01-01 PROCEDURE — G8399 PT W/DXA RESULTS DOCUMENT: HCPCS | Performed by: SURGERY

## 2017-01-01 PROCEDURE — G8598 ASA/ANTIPLAT THER USED: HCPCS | Performed by: SURGERY

## 2017-01-01 PROCEDURE — 1123F ACP DISCUSS/DSCN MKR DOCD: CPT | Performed by: NURSE PRACTITIONER

## 2017-01-01 PROCEDURE — G8419 CALC BMI OUT NRM PARAM NOF/U: HCPCS | Performed by: SURGERY

## 2017-01-01 PROCEDURE — 4040F PNEUMOC VAC/ADMIN/RCVD: CPT | Performed by: SURGERY

## 2017-01-01 PROCEDURE — 90662 IIV NO PRSV INCREASED AG IM: CPT | Performed by: FAMILY MEDICINE

## 2017-01-01 RX ORDER — POLYETHYLENE GLYCOL 3350 17 G/17G
17 POWDER, FOR SOLUTION ORAL DAILY
COMMUNITY

## 2017-01-01 RX ORDER — CILOSTAZOL 100 MG/1
100 TABLET ORAL 2 TIMES DAILY
Qty: 180 TABLET | Refills: 3 | Status: SHIPPED | OUTPATIENT
Start: 2017-01-01

## 2017-01-01 RX ORDER — LORAZEPAM 0.5 MG/1
0.5 TABLET ORAL NIGHTLY PRN
Qty: 90 TABLET | Refills: 0 | Status: SHIPPED | OUTPATIENT
Start: 2017-01-01 | End: 2017-01-01 | Stop reason: SDUPTHER

## 2017-01-01 RX ORDER — POTASSIUM CHLORIDE 750 MG/1
TABLET, FILM COATED, EXTENDED RELEASE ORAL
Qty: 90 TABLET | Refills: 1 | Status: SHIPPED | OUTPATIENT
Start: 2017-01-01 | End: 2017-01-01 | Stop reason: SDUPTHER

## 2017-01-01 RX ORDER — CLONIDINE HYDROCHLORIDE 0.1 MG/1
0.1 TABLET ORAL EVERY EVENING
Qty: 90 TABLET | Refills: 3 | Status: SHIPPED | OUTPATIENT
Start: 2017-01-01

## 2017-01-01 RX ORDER — CILOSTAZOL 100 MG/1
100 TABLET ORAL 2 TIMES DAILY
Qty: 60 TABLET | Refills: 3 | Status: SHIPPED | OUTPATIENT
Start: 2017-01-01 | End: 2017-01-01 | Stop reason: SDUPTHER

## 2017-01-01 RX ORDER — SERTRALINE HYDROCHLORIDE 25 MG/1
TABLET, FILM COATED ORAL
Qty: 90 TABLET | Refills: 3 | Status: SHIPPED | OUTPATIENT
Start: 2017-01-01 | End: 2017-01-01 | Stop reason: SDUPTHER

## 2017-01-01 RX ORDER — PREDNISONE 1 MG/1
3 TABLET ORAL DAILY
Qty: 90 TABLET | Refills: 3 | Status: SHIPPED | OUTPATIENT
Start: 2017-01-01

## 2017-01-01 RX ORDER — SODIUM CHLORIDE 9 MG/ML
INJECTION, SOLUTION INTRAVENOUS CONTINUOUS
Status: CANCELLED | OUTPATIENT
Start: 2017-01-01

## 2017-01-01 RX ORDER — METOPROLOL SUCCINATE 25 MG/1
25 TABLET, EXTENDED RELEASE ORAL DAILY
Qty: 90 TABLET | Refills: 3
Start: 2017-01-01

## 2017-01-01 RX ORDER — LORAZEPAM 0.5 MG/1
0.5 TABLET ORAL NIGHTLY PRN
Qty: 90 TABLET | Refills: 0 | Status: SHIPPED | OUTPATIENT
Start: 2017-01-01

## 2017-01-01 RX ORDER — ATORVASTATIN CALCIUM 40 MG/1
40 TABLET, FILM COATED ORAL DAILY
Qty: 90 TABLET | Refills: 3 | Status: SHIPPED | OUTPATIENT
Start: 2017-01-01

## 2017-01-01 RX ORDER — LANOLIN ALCOHOL/MO/W.PET/CERES
325 CREAM (GRAM) TOPICAL 2 TIMES DAILY
Qty: 90 TABLET | Refills: 3 | COMMUNITY
Start: 2017-01-01 | End: 2017-01-01

## 2017-01-01 RX ORDER — PREDNISONE 10 MG/1
15 TABLET ORAL DAILY
Qty: 60 TABLET | Refills: 0 | Status: SHIPPED | OUTPATIENT
Start: 2017-01-01 | End: 2017-01-01 | Stop reason: DRUGHIGH

## 2017-01-01 RX ORDER — PANTOPRAZOLE SODIUM 40 MG/1
TABLET, DELAYED RELEASE ORAL
Qty: 90 TABLET | Refills: 3 | Status: SHIPPED | OUTPATIENT
Start: 2017-01-01

## 2017-01-01 RX ORDER — LOSARTAN POTASSIUM AND HYDROCHLOROTHIAZIDE 12.5; 5 MG/1; MG/1
TABLET ORAL
Qty: 180 TABLET | Refills: 3 | Status: SHIPPED | OUTPATIENT
Start: 2017-01-01

## 2017-01-01 RX ORDER — SIMVASTATIN 40 MG
TABLET ORAL
Qty: 90 TABLET | Refills: 3 | Status: SHIPPED | OUTPATIENT
Start: 2017-01-01 | End: 2017-01-01 | Stop reason: ALTCHOICE

## 2017-01-01 RX ORDER — PREDNISONE 1 MG/1
5 TABLET ORAL DAILY
Qty: 30 TABLET | Refills: 3 | Status: SHIPPED | OUTPATIENT
Start: 2017-01-01

## 2017-01-01 RX ORDER — SODIUM CHLORIDE 0.9 % (FLUSH) 0.9 %
10 SYRINGE (ML) INJECTION EVERY 12 HOURS SCHEDULED
Status: CANCELLED | OUTPATIENT
Start: 2017-01-01

## 2017-01-01 RX ORDER — ALENDRONATE SODIUM 70 MG/1
70 TABLET ORAL
Qty: 4 TABLET | Refills: 11 | Status: SHIPPED | OUTPATIENT
Start: 2017-01-01

## 2017-01-01 RX ORDER — FLUCONAZOLE 100 MG/1
100 TABLET ORAL DAILY
Status: ON HOLD | COMMUNITY
End: 2017-01-01

## 2017-01-01 RX ORDER — PREDNISONE 10 MG/1
10 TABLET ORAL DAILY
Qty: 60 TABLET | Refills: 0 | Status: SHIPPED
Start: 2017-01-01 | End: 2017-01-01 | Stop reason: SDUPTHER

## 2017-01-01 RX ORDER — PREDNISONE 10 MG/1
20 TABLET ORAL DAILY
Qty: 60 TABLET | Refills: 0 | Status: SHIPPED | OUTPATIENT
Start: 2017-01-01 | End: 2017-01-01 | Stop reason: SDUPTHER

## 2017-01-01 RX ORDER — LOSARTAN POTASSIUM AND HYDROCHLOROTHIAZIDE 12.5; 5 MG/1; MG/1
TABLET ORAL
Qty: 180 TABLET | Refills: 1 | Status: SHIPPED | OUTPATIENT
Start: 2017-01-01 | End: 2017-01-01 | Stop reason: SDUPTHER

## 2017-01-01 RX ORDER — DILTIAZEM HYDROCHLORIDE 180 MG/1
CAPSULE, COATED, EXTENDED RELEASE ORAL
Qty: 90 CAPSULE | Refills: 3 | Status: SHIPPED | OUTPATIENT
Start: 2017-01-01

## 2017-01-01 RX ORDER — SODIUM CHLORIDE 0.9 % (FLUSH) 0.9 %
10 SYRINGE (ML) INJECTION PRN
Status: CANCELLED | OUTPATIENT
Start: 2017-01-01

## 2017-01-01 RX ORDER — POTASSIUM CHLORIDE 750 MG/1
TABLET, FILM COATED, EXTENDED RELEASE ORAL
Qty: 90 TABLET | Refills: 0 | Status: SHIPPED | OUTPATIENT
Start: 2017-01-01 | End: 2017-01-01 | Stop reason: SDUPTHER

## 2017-01-01 RX ORDER — POTASSIUM CHLORIDE 750 MG/1
TABLET, FILM COATED, EXTENDED RELEASE ORAL
Qty: 90 TABLET | Refills: 0 | Status: SHIPPED | OUTPATIENT
Start: 2017-01-01

## 2017-01-01 RX ORDER — METOPROLOL SUCCINATE 25 MG/1
25 TABLET, EXTENDED RELEASE ORAL DAILY
Qty: 90 TABLET | Refills: 3 | Status: SHIPPED | OUTPATIENT
Start: 2017-01-01 | End: 2017-01-01 | Stop reason: ALTCHOICE

## 2017-01-01 ASSESSMENT — PATIENT HEALTH QUESTIONNAIRE - PHQ9
SUM OF ALL RESPONSES TO PHQ QUESTIONS 1-9: 0
SUM OF ALL RESPONSES TO PHQ9 QUESTIONS 1 & 2: 0
2. FEELING DOWN, DEPRESSED OR HOPELESS: 0
1. LITTLE INTEREST OR PLEASURE IN DOING THINGS: 0

## 2017-01-01 ASSESSMENT — ENCOUNTER SYMPTOMS
ABDOMINAL PAIN: 0
TROUBLE SWALLOWING: 0
BLOOD IN STOOL: 0
SHORTNESS OF BREATH: 1
COUGH: 0
CONSTIPATION: 0
ABDOMINAL PAIN: 0
CONSTIPATION: 0
COUGH: 0
SHORTNESS OF BREATH: 1
BLOOD IN STOOL: 0
CONSTIPATION: 0
ABDOMINAL PAIN: 0
BACK PAIN: 1
SHORTNESS OF BREATH: 1
CONSTIPATION: 0
TROUBLE SWALLOWING: 0
BLOOD IN STOOL: 0
BACK PAIN: 1
CONSTIPATION: 0
ABDOMINAL PAIN: 0
SHORTNESS OF BREATH: 1
COUGH: 0
TROUBLE SWALLOWING: 0
ABDOMINAL PAIN: 0
SHORTNESS OF BREATH: 1
CONSTIPATION: 0
COUGH: 0
ABDOMINAL PAIN: 0
BACK PAIN: 1
COUGH: 0
SHORTNESS OF BREATH: 1
TROUBLE SWALLOWING: 0
BLOOD IN STOOL: 0
BACK PAIN: 1
BACK PAIN: 1
COUGH: 0
BACK PAIN: 1

## 2017-02-01 PROBLEM — D50.9 IRON DEFICIENCY ANEMIA: Status: ACTIVE | Noted: 2017-01-01

## 2017-09-11 PROBLEM — M35.3 PMR (POLYMYALGIA RHEUMATICA) (HCC): Status: ACTIVE | Noted: 2017-01-01

## 2017-10-02 NOTE — MR AVS SNAPSHOT
After Visit Summary             Alhaji Joaquin   10/2/2017 9:45 AM   Anti-coag visit    Description:  Female : 1935   Provider:  RALPH May [facility]   Department:  [address]/[facility] [employer]              Your Follow-Up and Future Appointments         Below is a list of your follow-up and future appointments. This may not be a complete list as you may have made appointments directly with providers that we are not aware of or your providers may have made some for you. Please call your providers to confirm appointments. It is important to keep your appointments. Please bring your current insurance card, photo ID, co-pay, and all medication bottles to your appointment. If self-pay, payment is expected at the time of service. Your To-Do List     Future Appointments Provider Department Dept Phone    10/13/2017 11:20 AM Audrey Knight 38 [phone]    Please arrive 15 minutes prior to appointment, bring photo ID and insurance card. 10/24/2017 8:15 AM Shyann Bro MD [facility] [phone]    Please arrive 15 minutes prior to appointment, bring photo ID and insurance card. 10/30/2017 10:00 AM [facility] [phone]    2017 8:30 AM [facility] [phone]    Please arrive 15 minutes prior to appointment, bring photo ID and insurance card.          Information from Your Visit        Department     Name Address Phone Fax    [address]/[facility] [employer] [address] [phone]      You Were Seen for:         Comments    Atrial fibrillation, unspecified type [facility])   [[id number]]         Anticoagulation Summary as of 10/2/2017              Today's INR 2.9    Next INR check 10/30/2017      Description Continue to take adjusted dose of 3 mg daily while on prednisone  Following prednisone, return to weekly dose of 4mg Mon & Thu and 3 mg all other days.        Vital Signs     Smoking Status                   Never Smoker              Medications and Orders      Your Current Medications Are              predniSONE (DELTASONE) 10 MG tablet Take 2 tablets by mouth daily    metoprolol succinate (TOPROL XL) 25 MG extended release tablet Take 1 tablet by mouth daily    simvastatin (ZOCOR) 40 MG tablet TAKE 1 TABLET EVERY NIGHT    sertraline (ZOLOFT) 50 MG tablet TAKE 1 TABLET EVERY DAY    Wheat Dextrin (BENEFIBER) POWD Take 4 g by mouth daily    cilostazol (PLETAL) 100 MG tablet Take 1 tablet by mouth 2 times daily    LORazepam (ATIVAN) 0.5 MG tablet Take 1 tablet by mouth nightly as needed for Anxiety TAKE 1 TABLET EVERY NIGHT    cloNIDine (CATAPRES) 0.1 MG tablet Take 1 tablet by mouth every evening    pantoprazole (PROTONIX) 40 MG tablet TAKE 1 TABLET EVERY DAY    losartan-hydrochlorothiazide (HYZAAR) 50-12.5 MG per tablet TAKE 2 TABLETS EVERY DAY    potassium chloride (KLOR-CON) 10 MEQ extended release tablet TAKE 1 TABLET BY MOUTH EVERY DAY    polyethylene glycol (GLYCOLAX) powder Take 17 g by mouth daily    diltiazem (CARTIA XT) 180 MG extended release capsule TAKE ONE CAPSULE BY MOUTH DAILY    ferrous sulfate (FE TABS) 325 (65 FE) MG EC tablet Take 1 tablet by mouth 2 times daily    Probiotic Product (TRUBIOTICS PO) Take by mouth    traMADol (ULTRAM) 50 MG tablet Take 1 tablet by mouth every 6 hours as needed for Pain (knee pain) Indications: PRN for pain nasal fracture    loratadine (CLARITIN) 10 MG tablet Take 10 mg by mouth daily    warfarin (COUMADIN) 4 MG tablet Take 4 mg by mouth See Admin Instructions Dose adjusted by [facility]    Cholecalciferol (VITAMIN D3) 2000 UNITS CAPS Take by mouth    Omega-3 Fatty Acids (FISH OIL) 1000 MG CAPS Take 3,000 mg by mouth 3 times daily

## 2017-10-02 NOTE — PROGRESS NOTES
Ms. Grace Chase is a 80 y.o. y/o female with history of Afib diagnosed in about  who presents today for anticoagulation monitoring and adjustment. Pertinent PMH: L knee replacement - 6/10/10, TIA -Aug 2010. Two stents placed 14. She is a retired  at [employer] in [place]. She grew up in [facility]. Her sister lives in [place], [place]  She came here after her   at the age of 54. Patient Reported Findings:  Yes     No  [x]   []       Patient verifies current dosing regimen as listed dose was adjusted to 3mg daily while on steroid therapy  []   [x]       S/S bleeding/bruising/swelling/SOB  []   [x]       Blood in urine or stool  []   [x]       Procedures scheduled in the future at this time  []   [x]       Missed Dose  []   [x]       Extra Dose  [x]   []       Change in medications continues to take 20 mg prednisone daily, not expected to end any time soon  []   [x]       Change in health/diet/appetite- has been trying to remain consistent with vegetable intake   []   [x]       Change in alcohol use  []   [x]       Change in activity  []   [x]       Hospital admission  []   [x]       Emergency department visit  []   [x]       Other complaints    Clinical Outcomes:  Yes     No  []   [x]       Major bleeding event  []   [x]       Thromboembolic event  Patient uses both 4mg and 3mg tablets   Duration of warfarin Therapy: indefinite  INR Range:  2.0-3.0    INR 2.9 today. Continue to take adjusted dose of 3 mg daily while on prednisone  If d/c prednisone, return to weekly dose of 4mg Mon & Thu and 3 mg all other days. Patient slightly concerned about INR being at high end of therapeutic range, discussed eating an extra serving of kale tonight to lower INR slightly   Recheck INR 4 weeks, 10/30.       Referring cardiologist is Dr. Gillian Welch  INR (no units)   Date Value   10/02/2017 2.9   2017 2.3   2017 2.30   2017 3.2

## 2017-10-13 PROBLEM — F51.01 PRIMARY INSOMNIA: Status: ACTIVE | Noted: 2017-01-01

## 2017-10-13 NOTE — PROGRESS NOTES
tablet by mouth 2 times daily 180 tablet 3    cloNIDine (CATAPRES) 0.1 MG tablet Take 1 tablet by mouth every evening 90 tablet 3    pantoprazole (PROTONIX) 40 MG tablet TAKE 1 TABLET EVERY DAY 90 tablet 3    losartan-hydrochlorothiazide (HYZAAR) 50-12.5 MG per tablet TAKE 2 TABLETS EVERY  tablet 1    potassium chloride (KLOR-CON) 10 MEQ extended release tablet TAKE 1 TABLET BY MOUTH EVERY DAY 90 tablet 1    polyethylene glycol (GLYCOLAX) powder Take 17 g by mouth daily      diltiazem (CARTIA XT) 180 MG extended release capsule TAKE ONE CAPSULE BY MOUTH DAILY 90 capsule 3    ferrous sulfate (FE TABS) 325 (65 FE) MG EC tablet Take 1 tablet by mouth 2 times daily 90 tablet 3    Probiotic Product (TRUBIOTICS PO) Take by mouth      traMADol (ULTRAM) 50 MG tablet Take 1 tablet by mouth every 6 hours as needed for Pain (knee pain) Indications: PRN for pain nasal fracture 20 tablet 0    loratadine (CLARITIN) 10 MG tablet Take 10 mg by mouth daily      warfarin (COUMADIN) 4 MG tablet Take 4 mg by mouth See Admin Instructions Dose adjusted by [facility]      Cholecalciferol (VITAMIN D3) 2000 UNITS CAPS Take by mouth      Omega-3 Fatty Acids (FISH OIL) 1000 MG CAPS Take 3,000 mg by mouth 3 times daily      warfarin (COUMADIN) 3 MG tablet Take 3 mg by mouth See Admin Instructions Dose adjusted by [facility]      nitroGLYCERIN (NITROSTAT) 0.4 MG SL tablet Place 1 tablet under the tongue every 5 minutes as needed for Chest pain. 25 tablet 12    therapeutic multivitamin-minerals (THERAGRAN-M) tablet Take 1 tablet by mouth daily.          Allergies   Allergen Reactions    Codeine Other (See Comments)     abd pain    Penicillins Rash       Social History   Substance Use Topics    Smoking status: Never Smoker    Smokeless tobacco: Never Used    Alcohol use No       BP (!) 140/70   Pulse 99   Wt 146 lb (66.2 kg)   SpO2 97%   BMI 25.06 kg/m²                  Review of Systems   Constitutional: Negative for fever and malaise/fatigue. HENT: Negative for hearing loss. Respiratory: Positive for shortness of breath (1 flight). Negative for cough. Cardiovascular: Negative for chest pain. Left leg pain   Gastrointestinal: Negative for abdominal pain, blood in stool and constipation. Musculoskeletal: Positive for back pain. Skin: Negative for rash. Endo/Heme/Allergies: Does not bruise/bleed easily. Having some issues with insomnia and taking lorazepam for this        Physical Exam   Constitutional: She is oriented to person, place, and time. She appears well-developed and well-nourished. HENT:   No temporal artery tenderness   Cardiovascular: An irregularly irregular rhythm present. Tachycardia present. 90   Pulmonary/Chest: Effort normal and breath sounds normal.   Musculoskeletal:   Good strength. no tenderness   Neurological: She is alert and oriented to person, place, and time. Sergio Perla was seen today for pain. Diagnoses and all orders for this visit:    PMR (polymyalgia rheumatica) (HCC)  -     Sedimentation Rate    Chronic atrial fibrillation (HCC)    Permanent atrial fibrillation (HCC)    Primary insomnia    Other orders  -     atorvastatin (LIPITOR) 40 MG tablet; Take 1 tablet by mouth daily     Patient was changed from simvastatin to Lipitor because of the interaction with the simvastatin and diltiazem, her prednisone is at 15 mg a day she is to have a sedimentation rate performed later this month when she gets her pro time rechecked if it is normal I'll reduce her to 10 mg of prednisone she is an appointment to see me next month, and we'll attempt further reduction in her prednisone she will need an Rx at that time if her 5 mg and 1 mg tabs.  Discussed with patient that the side effects of the prednisone are expected  Refill lorazepam. OARRS report accessed and reviewed

## 2017-10-24 NOTE — PROGRESS NOTES
Pressure in her brother; Stroke in her father.      Current Outpatient Prescriptions   Medication Sig Dispense Refill    predniSONE (DELTASONE) 10 MG tablet Take 1.5 tablets by mouth daily 60 tablet 0    potassium chloride (KLOR-CON) 10 MEQ extended release tablet TAKE 1 TABLET BY MOUTH EVERY DAY 90 tablet 0    LORazepam (ATIVAN) 0.5 MG tablet Take 1 tablet by mouth nightly as needed for Anxiety TAKE 1 TABLET EVERY NIGHT 90 tablet 0    losartan-hydrochlorothiazide (HYZAAR) 50-12.5 MG per tablet TAKE 2 TABLETS EVERY  tablet 3    atorvastatin (LIPITOR) 40 MG tablet Take 1 tablet by mouth daily 90 tablet 3    metoprolol succinate (TOPROL XL) 25 MG extended release tablet Take 1 tablet by mouth daily 90 tablet 3    sertraline (ZOLOFT) 50 MG tablet TAKE 1 TABLET EVERY DAY 90 tablet 2    Wheat Dextrin (BENEFIBER) POWD Take 4 g by mouth daily      cilostazol (PLETAL) 100 MG tablet Take 1 tablet by mouth 2 times daily 180 tablet 3    cloNIDine (CATAPRES) 0.1 MG tablet Take 1 tablet by mouth every evening 90 tablet 3    pantoprazole (PROTONIX) 40 MG tablet TAKE 1 TABLET EVERY DAY 90 tablet 3    polyethylene glycol (GLYCOLAX) powder Take 17 g by mouth daily      diltiazem (CARTIA XT) 180 MG extended release capsule TAKE ONE CAPSULE BY MOUTH DAILY 90 capsule 3    ferrous sulfate (FE TABS) 325 (65 FE) MG EC tablet Take 1 tablet by mouth 2 times daily 90 tablet 3    Probiotic Product (TRUBIOTICS PO) Take by mouth      traMADol (ULTRAM) 50 MG tablet Take 1 tablet by mouth every 6 hours as needed for Pain (knee pain) Indications: PRN for pain nasal fracture 20 tablet 0    loratadine (CLARITIN) 10 MG tablet Take 10 mg by mouth daily      warfarin (COUMADIN) 4 MG tablet Take 4 mg by mouth See Admin Instructions Dose adjusted by [facility]      Cholecalciferol (VITAMIN D3) 2000 UNITS CAPS Take by mouth      Omega-3 Fatty Acids (FISH OIL) 1000 MG CAPS Take 3,000 mg by mouth 3 times daily      warfarin breath sounds without dullness  Cardiovascular:  · The apical impulses not displaced  · Heart tones are crisp and normal  · Cervical veins are not engorged  · The carotid upstroke is normal in amplitude and contour without delay or bruit  · Irregular rhythm with a pattern. No  murmurs gallops or rubs  · Peripheral pulses are symmetrical and full  · There is no clubbing, cyanosis of the extremities. · No edema  · Femoral Arteries: 2+ and equal  · Pedal Pulses: 2+ and equal   Abdomen:  · No masses or tenderness  · Liver/Spleen: No Abnormalities Noted  Neurological/Psychiatric:  · Alert and oriented in all spheres  · Moves all extremities well  R arm in sling  · Exhibits normal gait balance and coordination  · No abnormalities of mood, affect, memory, mentation, or behavior are noted    JACOB 1/25/17:  Summary   -Normal left ventricle size, wall thickness and systolic function with an   estimated ejection fraction of 60%.  -The mitral leaflets have non specific thickening with normal leaflet   mobility. There is mild prolapse. There are 2 jets of mitral regurgitation   resulting in moderate-severe MR.   -The left atrium is dilated.   -There aortic valve is tricuspid.   -Aortic valve appears sclerotic but opens adequately.   -The thoracic aorta has mild plaque. ECHO 12/10/16:  Summary   Normal left ventricle size, wall thickness and systolic function with an   estimated ejection fraction of 55-60%.     Severe mitral regurgitation is present. Dilated left atrium.      Right atrial size is mildly dilated . There is severe tricuspid   regurgitation with RVSP estimated at 58 mmHg plus right atrial pressure   suggestive of pulmonary hypertension.       Assessment:     Problem: Mitral regurgitation  Mod-severe by JACOB. Stable at this time with mild SOB with stairs only, not new    Problem: Coronary artery disease   Functional class I. No adverse symptoms.      2007 cath> Single vessel CAD with a complex and calcified ostial circumflex at the left main. Nonobstructive left interior descending right coronary artery lesions. Normal left ventriculogram (catheterization 2007)   7/2011 ECHO> EF 40-50%, mild MR, TR. Mild aortic stenosis. 2012 GXT Jalen> No reversible ischemia, normal EF.    7/1/2014 [facility] (Dr. Emerson Montgomery)- Single vessel CAD involving RCA. Nonobstructive CAD involving CX, OM,  Diagonal with normal LVEF 55%. Intervention Resolute (BRIAN) to RCA. Atypical chest discomfort, sounds musculoskeletal in nature. Problem: Atrial fibrillation  Irregularly irregular today, rate controlled. Followed in anticoagulation clinic while on Coumadin. INRs have been therapeutic. Problem: Hypertension   Well controlled taking clonidine and cartia XT. Consistent followup. Compliant with the medical regimen. No apparent adverse effects of the current medical regimen. Problem: Carotid Artery Stenosis  Followed by Dr. Jose Eaton. 11/2012 Carotid US> EDUARDA 56-14%, RECA >54%, LICA 64-14%, LECA >22%. Problem: Hyperlipidemia  On Zocor 40 mg. Managed per PCP. Problem: Diabetes mellitus  Followed by Dr. Demetrio Matute. 1/26/17 A1C- 6.2    Of Note:  During her recent hospitalization for a knee infection was noted to have significant mitral regurgitation tricuspid regurgitation. She has lost weight and has apparent protein calorie malnutrition. Her sedimentation rate is markedly elevated at 50. I reviewed the transthoracic echocardiogram with Dr. Sudhir Auguste, and there is no apparent vegetation. However the patient's clinical decline necessitated an aggressive evaluation; therefore, a JACOB was then performed which showed mod-severe MR (see full results above). Referred to Dr. Do Cheema to discuss potential surgical options should the need for replacement arise in the future. cardiac catheterization would be necessary prior to any consideration of robotic surgery showed her functional status change.       Consult note  Referred to Dr. Pushpa Link to discuss potential surgical options should the need for replacement arise in the future. cardiac catheterization would be necessary prior to any consideration of robotic surgery showed her functional status change. IMPRESSION/PLAN per Dr. Pushpa Link  Mrs. Delbert Tanner is a very pleasant 61-year-old female with no symptoms for mitral regurgitation and tricuspid regurgitation and chronic atrial fibrillation. Unfortunately from a robotic standpoint, mitral annular calcification poses a significant issue to performing her surgery robotically. Certainly dealing with significant mitral annular calcification can be challenging even to an open surgery but I feel that her only option for her mitral valve is a valve replacement and the robotic instruments could not generate enough force to suture through or remove the calcium. She would have to have a conventional sternotomy at which time I would clip her appendage and repair her tricuspid valve and I would perform an extensive maze procedure in the chance that we can also cure her A. fib. She is reluctant to undergo such a large operation at this time given her lack of symptoms and I would not push her to have surgery at this time. Finally we discussed transcatheter options and I feel that because of the mitral annular calcification in the mild to moderate degree of stenosis that she has at this time, an E clip would be ill advised because it would likely leave her with more significant degree of stenosis. My final recommendation is continued close follow-up under Dr. George Anthony watchful eye and if she develops symptoms of the valve disease get significantly worse than I certainly would consider at that time a conventional operation as outlined above.     I appreciate the opportunity of cooperating in the care of this individual    Plan:  Medications reviewed, no change. Follow up in 4 months with .     Giovanny Boles M.D., Kamlesh Nash  NOTE: This report was transcribed using voice recognition software. Every effort was made to ensure accuracy; however, inadvertent computerized transcription errors may be present.

## 2017-10-30 NOTE — TELEPHONE ENCOUNTER
This is corrected should be done about every 4 weeks we are adjusting her steroids based on her sedimentation rate for a condition called polymyalgia rheumatica

## 2017-11-06 PROBLEM — E11.59 TYPE 2 DIABETES MELLITUS WITH CIRCULATORY DISORDER (HCC): Status: ACTIVE | Noted: 2017-01-01

## 2017-11-06 PROBLEM — M85.852 OSTEOPENIA OF LEFT THIGH: Status: ACTIVE | Noted: 2017-01-01

## 2017-11-06 NOTE — PROGRESS NOTES
Vaccine Information Sheet, \"Influenza - Inactivated\"  given to Samantha Hassan, or parent/legal guardian of  Samantha Hassan and verbalized understanding. Patient responses:    Have you ever had a reaction to a flu vaccine? No  Are you able to eat eggs without adverse effects? Yes  Do you have any current illness? No  Have you ever had Guillian Wishon Syndrome? No    Flu vaccine given per order. Please see immunization tab.

## 2017-11-06 NOTE — PROGRESS NOTES
10 MG tablet Take 1 tablet by mouth daily 60 tablet 0    potassium chloride (KLOR-CON) 10 MEQ extended release tablet TAKE 1 TABLET BY MOUTH EVERY DAY 90 tablet 0    LORazepam (ATIVAN) 0.5 MG tablet Take 1 tablet by mouth nightly as needed for Anxiety TAKE 1 TABLET EVERY NIGHT 90 tablet 0    losartan-hydrochlorothiazide (HYZAAR) 50-12.5 MG per tablet TAKE 2 TABLETS EVERY  tablet 3    atorvastatin (LIPITOR) 40 MG tablet Take 1 tablet by mouth daily 90 tablet 3    metoprolol succinate (TOPROL XL) 25 MG extended release tablet Take 1 tablet by mouth daily 90 tablet 3    sertraline (ZOLOFT) 50 MG tablet TAKE 1 TABLET EVERY DAY 90 tablet 2    Wheat Dextrin (BENEFIBER) POWD Take 4 g by mouth daily      cilostazol (PLETAL) 100 MG tablet Take 1 tablet by mouth 2 times daily 180 tablet 3    cloNIDine (CATAPRES) 0.1 MG tablet Take 1 tablet by mouth every evening 90 tablet 3    pantoprazole (PROTONIX) 40 MG tablet TAKE 1 TABLET EVERY DAY 90 tablet 3    polyethylene glycol (GLYCOLAX) powder Take 17 g by mouth daily      ferrous sulfate (FE TABS) 325 (65 FE) MG EC tablet Take 1 tablet by mouth 2 times daily (Patient taking differently: Take 325 mg by mouth 2 times daily 1 qod) 90 tablet 3    Probiotic Product (TRUBIOTICS PO) Take by mouth      traMADol (ULTRAM) 50 MG tablet Take 1 tablet by mouth every 6 hours as needed for Pain (knee pain) Indications: PRN for pain nasal fracture 20 tablet 0    loratadine (CLARITIN) 10 MG tablet Take 10 mg by mouth daily      warfarin (COUMADIN) 4 MG tablet Take 4 mg by mouth See Admin Instructions Dose adjusted by [facility]      Cholecalciferol (VITAMIN D3) 2000 UNITS CAPS Take by mouth      Omega-3 Fatty Acids (FISH OIL) 1000 MG CAPS Take 3,000 mg by mouth 3 times daily      warfarin (COUMADIN) 3 MG tablet Take 3 mg by mouth See Admin Instructions Dose adjusted by [facility]      nitroGLYCERIN (NITROSTAT) 0.4 MG SL tablet Place 1 tablet under the tongue every 5 minutes as needed for Chest pain. 25 tablet 12    therapeutic multivitamin-minerals (THERAGRAN-M) tablet Take 1 tablet by mouth daily.  diltiazem (CARTIA XT) 180 MG extended release capsule TAKE ONE CAPSULE BY MOUTH DAILY 90 capsule 3     No current facility-administered medications on file prior to visit. has osteopenia with 5 % risk of hip fracture  vitamon D was OK    Review of Systems   Constitutional: Negative for fever and malaise/fatigue. HENT: Negative for hearing loss. Respiratory: Positive for shortness of breath (1 flight). Negative for cough. Cardiovascular: Negative for chest pain. Left leg pain   Gastrointestinal: Negative for abdominal pain, blood in stool and constipation. Musculoskeletal: Positive for back pain. Skin: Negative for rash. Endo/Heme/Allergies: Does not bruise/bleed easily. Physical Exam   Constitutional: She is oriented to person, place, and time. She appears well-developed and well-nourished. HENT:   No temporal artery tenderness   Cardiovascular: An irregularly irregular rhythm present. Tachycardia present. Murmur (2/6) heard. Pulses:       Dorsalis pedis pulses are 2+ on the right side, and 0 on the left side. Posterior tibial pulses are 2+ on the right side, and 0 on the left side. 90   Pulmonary/Chest: Effort normal and breath sounds normal.   Musculoskeletal:   Good strength. no tenderness   Neurological: She is alert and oriented to person, place, and time. Psychiatric: She has a normal mood and affect. Dennise Coughlin was seen today for hypertension, hyperlipidemia, atrial fibrillation, anxiety, diabetes, insomnia and anemia.     Diagnoses and all orders for this visit:    PMR (polymyalgia rheumatica) ([facility])  -     Sedimentation Rate    Chronic atrial fibrillation (HCC)    ASHD (arteriosclerotic heart disease)    Diabetes mellitus due to underlying condition with diabetic peripheral angiopathy without gangrene, without long-term current use of insulin (HCC)  -     Hemoglobin A1C; Future    Osteopenia of left thigh    Type 2 diabetes mellitus with diabetic peripheral angiopathy without gangrene, without long-term current use of insulin (HCC)  -     Hemoglobin A1C; Future    Other orders  -     INFLUENZA, HIGH DOSE, 65 YRS +, IM, PF, PREFILL SYR, 0.5ML (FLUZONE HD)  -     alendronate (FOSAMAX) 70 MG tablet; Take 1 tablet by mouth every 7 days    diabetes is controlled, has osteopenia, will rx with alendronate: Benefits and side effects of medication discussed with patient    PMR controlled  If sed rate controlled. Go to 8 mg Prednisoneif sed rate OK.  Check a1c and sed rate one months   cad and a fib and cad are stable   OARRS report accessed and reviewed , Same medications

## 2017-11-27 NOTE — PROGRESS NOTES
Ms. Chelsea Yung is a [de-identified] y.o. y/o female with history of Afib diagnosed in about  who presents today for anticoagulation monitoring and adjustment. Pertinent PMH: L knee replacement - 6/10/10, TIA -Aug 2010. Two stents placed 14. She is a retired  at [employer] in [place]. She grew up in [facility]. Her sister lives in [place], [place]  She came here after her   at the age of 54. Patient Reported Findings:  Yes     No  [x]   []       Patient verifies current dosing regimen as listed dose is adjusted to 3mg daily while on steroid therapy  []   [x]       S/S bleeding/bruising/swelling/SOB  []   [x]       Blood in urine or stool  [x]   []       Procedures scheduled in the future at this time-Colonoscopy scheduled for . Will hold warfarin for 5 days  []   [x]       Missed Dose  []   [x]       Extra Dose  []   [x]       Change in medications She continues with prednisone to 10 mg daily. Patient is hoping to have decreased again as it is causing her to have moon face and gain weight  []   [x]       Change in health/diet/appetite- has been trying to remain consistent with vegetable intake   []   [x]       Change in alcohol use  []   [x]       Change in activity  []   [x]       Hospital admission  []   [x]       Emergency department visit  []   [x]       Other complaints    Clinical Outcomes:  Yes     No  []   [x]       Major bleeding event  []   [x]       Thromboembolic event  Patient uses both 4mg and 3mg tablets   Duration of warfarin Therapy: indefinite  INR Range:  2.0-3.0    INR 2.7 today. Continue to take adjusted dose of 3 mg daily while on prednisone  If d/c prednisone, consider returning to weekly dose of 4mg Mon & Thu and 3 mg all other days. Recheck INR 3 weeks.       Referring cardiologist is Dr. Maximus Crisostomo  INR (no units)   Date Value   2017 2.7   10/30/2017 2.8   10/02/2017 2.9   2017 2.3

## 2017-11-27 NOTE — TELEPHONE ENCOUNTER
Pt calling to adv that she is having a colonoscopy on 12/8/17 Dr. Kimberly Mustafa will be doing procedure. Wants to if ok to have procedure and if she needs to stop taking Pletal and Warfarin.  Please call to adv thank you

## 2017-11-27 NOTE — TELEPHONE ENCOUNTER
Spoke with DCE, pt may stop coumadin 2 days prior to colonscopy. Pletal does not need to be stopped.

## 2017-11-29 NOTE — LETTER
[facility] - [address]  [address]  [address] [facility] [address] [address]  Phone: [phone]  Fax: [phone]        November 29, 2017      Dear Lei Hawk,      Pt may stop coumadin 2 days prior to colonscopy. Pletal does not need to be stopped. If you have any questions or concerns, please don't hesitate to call.     Sincerely,        Celine Alexandra MD

## 2017-11-29 NOTE — LETTER
[address] [address] [address] [place] [address] [address]  Phone: [phone]  Fax: [phone]    Khoi Hankins MD        November 29, 2017        Dear Dr. Kaur Avitia:    Preoperative Risk Assessment    Signa Holiday  1935    Procedure  Colonscopy    Anesthesia  Conscious sedation      Antiplatelets   · Do not stop/change antiplatelets unless absolutely necessary perioperatively. · ASA 81mg should be continued perioperatively unless contraindicated. · Dual antiplatelet for Bare Metal Stents should not be stopped for >30 days. · Dual antiplatelet for Drug Eluting Stents should not be stopped for >365 days. · Premature discontinuation of dual antiplatelet therapy increases cardiovascular risk and should be avoided for elective procedures that could reasonably be delayed. · If the procedure is not elective and Plavix, Effient or Brilinta are absolutely contraindicated perioperatively, discontinuation 5 days prior to procedure should be adequate. · If dual antiplatelet interrupted for a procedure, therapy should be restarted ASAP and at the discretion of the surgeon. Recommendation  Cardiovascular testing merited prior to surgery: None  Cardiovascular risk for procedure:   Low  Antiplatelet recommendations (see above):  N/A  Anticoagulant recommendations (see above): May hold coumadin for 5 days    If you have any questions or concerns, please don't hesitate to call.     Sincerely,        Khoi Hankins MD

## 2017-12-21 NOTE — PROGRESS NOTES
Ms. Marylene Hollering is a [de-identified] y.o. y/o female with history of Afib diagnosed in about  who presents today for anticoagulation monitoring and adjustment. Pertinent PMH: L knee replacement - 6/10/10, TIA -Aug 2010. Two stents placed 14. She is a retired  at [employer] in [facility]. She grew up in [facility]. Her sister lives in [place], [address] [place] [address] [address]  She came here after her   at the age of 54. Patient Reported Findings:  Yes     No  [x]   []       Patient verifies current dosing regimen as listed dose is adjusted to 3mg daily while on steroid therapy  []   [x]       S/S bleeding/bruising/swelling/SOB  []   [x]       Blood in urine or stool  [x]   []       Procedures scheduled in the future at this time-Colonoscopy delayed until 18. Will hold warfarin for 5 days  []   [x]       Missed Dose  []   [x]       Extra Dose  []   [x]       Change in medications She continues with prednisone but reduced to 8 mg daily. Patient is hoping to have decreased again as it is causing her to have moon face and gain weight  []   [x]       Change in health/diet/appetite- has been trying to remain consistent with vegetable intake   []   [x]       Change in alcohol use  []   [x]       Change in activity  []   [x]       Hospital admission  []   [x]       Emergency department visit  []   [x]       Other complaints    Clinical Outcomes:  Yes     No  []   [x]       Major bleeding event  []   [x]       Thromboembolic event  Patient uses both 4mg and 3mg tablets   Duration of warfarin Therapy: indefinite  INR Range:  2.0-3.0    INR 2.8 today. Continue to take adjusted dose of 3 mg daily while on prednisone  If/when d/c prednisone, consider returning to weekly dose of 4mg Mon & Thu and 3 mg all other days. Recheck INR 4 weeks.       Referring cardiologist is Dr. Shahab Zuniga  INR (no units)   Date Value   2017 2.8   2017 2.7   10/30/2017 2.8   10/02/2017 2.9

## 2017-12-29 NOTE — PROGRESS NOTES
unanticipated weight loss. There's been no change in energy level, sleep pattern, or activity level. · Eyes: No visual changes or diplopia. No scleral icterus. · ENT: No Headaches, hearing loss or vertigo. No mouth sores or sore throat. · Cardiovascular: Reviewed in HPI  · Respiratory: No cough or wheezing, no sputum production. No hematemesis. · Gastrointestinal: No abdominal pain, appetite loss, blood in stools. No change in bowel or bladder habits. · Genitourinary: No dysuria, trouble voiding, or hematuria. · Musculoskeletal:  No gait disturbance, weakness or joint complaints. · Integumentary: No rash or pruritis. · Neurological: No headache, diplopia, change in muscle strength, numbness or tingling. No change in gait, balance, coordination, mood, affect, memory, mentation, behavior. · Psychiatric: No anxiety, no depression. · Endocrine: No malaise, fatigue or temperature intolerance. No excessive thirst, fluid intake, or urination. No tremor. · Hematologic/Lymphatic: No abnormal bruising or bleeding, blood clots or swollen lymph nodes. · Allergic/Immunologic: No nasal congestion or hives. Physical Examination:    Vitals:    12/29/17 1046   BP: (!) 144/66          General appearance: alert, appears stated age, cooperative and no distress  Head: Normocephalic, without obvious abnormality, atraumatic  Eyes: conjunctivae/corneas clear. PERRL, EOM's intact. Fundi benign  Neck: no adenopathy, no carotid bruit, no JVD, supple, symmetrical, trachea midline and thyroid: not enlarged, symmetric, no tenderness/mass/nodules  Lungs: clear to auscultation bilaterally  Heart: regular rate and rhythm  Abdomen: soft, non-tender.  Bowel sounds normal. No masses,  no organomegaly  Extremities: extremities normal, atraumatic, no cyanosis or edema    Pulses:   L brachial 2 R brachial 2   L radial 2 R radial 2   L femoral 2 R femoral 2   L popliteal 2 R popliteal 2   L posterior tibial 2 R posterior tibial 2   L dorsalis pedis 2 R dorsalis pedis 2   Doppler Signals:  +    Neurologic: Grossly normal    MEDICAL DECISION MAKING/TESTING  I have reviewed the testing personally and my interpretation is below. Right  The right internal carotid artery appears to have a <50% diameter reducing  stenosis based on velocity criteria. The right external carotid artery appears to have >50% diameter reduction  based on velocity criteria. The right vertebral artery demonstrates normal antegrade flow. The right subclavian artery is visualized and demonstrates multiphasic flow. Left  The left internal carotid artery appears to have a 50-69% diameter reducing  stenosis based on velocity criteria. The left external carotid artery appears to have >50% diameter reduction based  on velocity criteria. The left vertebral artery demonstrates normal antegrade flow. The left subclavian artery is visualized and demonstrates multiphasic flow. Assessment:     Patient Active Problem List   Diagnosis    ASHD (arteriosclerotic heart disease)    AF (atrial fibrillation)    Hypertension    Anxiety    TIA (transient ischemic attack)    Carotid artery stenosis    Hyperlipidemia    Trigeminal neuralgia    Anemia    Diabetes mellitus due to underlying condition with diabetic peripheral angiopathy without gangrene ([address]    [facility]AD [facility]peripheral [facility]) [facility]    GERD (gastroesophageal reflux disease)    Trochanteric bursitis of right hip    DDD (degenerative disc disease), lumbar    Hyperglycemia    Abdominal aortic atherosclerosis ([address]    Secondary diabetes mellitus with peripheral circulatory disorder ([facility])    Generalized OA    Chronic atrial fibrillation ([facility])    Nasal septal deviation    Non-rheumatic mitral regurgitation    Iron deficiency anemia    PMR (polymyalgia rheumatica) ([facility])    Primary insomnia    Osteopenia of left thigh    Type 2 diabetes mellitus with circulatory disorder ([address]       Plan:  1.  Carotid atherosclerosis, bilateral  60-year-old female with stable asymptomatic carotid atherosclerosis. There has been no interval progression. Recommend repeat carotid duplex scan in 6 months. - VL DUP CAROTID BILATERAL; Future        Thank you for allowing me to participate in the care of this individual.  Please do not hesitate to contact me with any questions. Nayely Camarena M.D., FACS.  12/29/2017  10:48 AM

## 2018-01-01 ENCOUNTER — TELEPHONE (OUTPATIENT)
Dept: FAMILY MEDICINE CLINIC | Age: 83
End: 2018-01-01

## 2018-01-01 ENCOUNTER — HOSPITAL ENCOUNTER (OUTPATIENT)
Dept: OTHER | Age: 83
Discharge: HOME | End: 2018-01-31
Attending: INTERNAL MEDICINE | Admitting: INTERNAL MEDICINE

## 2018-01-02 NOTE — TELEPHONE ENCOUNTER
Katherine Hutton of the [facility] office called to advise pt was found  in a chair this afternoon    Will be sending death certificate for signature